# Patient Record
Sex: FEMALE | Race: WHITE | NOT HISPANIC OR LATINO | Employment: UNEMPLOYED | URBAN - METROPOLITAN AREA
[De-identification: names, ages, dates, MRNs, and addresses within clinical notes are randomized per-mention and may not be internally consistent; named-entity substitution may affect disease eponyms.]

---

## 2019-07-16 ENCOUNTER — HOSPITAL ENCOUNTER (EMERGENCY)
Facility: HOSPITAL | Age: 54
Discharge: PSYCHIATRIC OR SUBSTANCE USE FACILITY | End: 2019-07-17
Attending: EMERGENCY MEDICINE | Admitting: EMERGENCY MEDICINE

## 2019-07-16 VITALS
HEART RATE: 80 BPM | HEIGHT: 67 IN | SYSTOLIC BLOOD PRESSURE: 132 MMHG | DIASTOLIC BLOOD PRESSURE: 86 MMHG | RESPIRATION RATE: 16 BRPM | WEIGHT: 147 LBS | BODY MASS INDEX: 23.07 KG/M2 | TEMPERATURE: 97.9 F | OXYGEN SATURATION: 99 %

## 2019-07-16 DIAGNOSIS — F29 PSYCHOSIS, UNSPECIFIED PSYCHOSIS TYPE (HCC): Primary | ICD-10-CM

## 2019-07-16 LAB
AMPHET+METHAMPHET UR QL: NEGATIVE
BARBITURATES UR QL SCN: NORMAL
BENZODIAZ UR QL SCN: NEGATIVE
CANNABINOIDS SERPL QL: NEGATIVE
COCAINE UR QL: NEGATIVE
CREAT UR-MCNC: 215.6 MG/DL
METHADONE UR QL SCN: NEGATIVE
OPIATES UR QL: NEGATIVE
PCP UR QL SCN: NEGATIVE

## 2019-07-16 PROCEDURE — 80307 DRUG TEST PRSMV CHEM ANLYZR: CPT | Performed by: EMERGENCY MEDICINE

## 2019-07-16 PROCEDURE — P9612 CATHETERIZE FOR URINE SPEC: HCPCS

## 2019-07-16 PROCEDURE — 96372 THER/PROPH/DIAG INJ SC/IM: CPT

## 2019-07-16 PROCEDURE — 99285 EMERGENCY DEPT VISIT HI MDM: CPT

## 2019-07-16 PROCEDURE — 25010000002 ZIPRASIDONE MESYLATE PER 10 MG: Performed by: EMERGENCY MEDICINE

## 2019-07-16 PROCEDURE — 82570 ASSAY OF URINE CREATININE: CPT | Performed by: EMERGENCY MEDICINE

## 2019-07-16 RX ORDER — ZIPRASIDONE MESYLATE 20 MG/ML
10 INJECTION, POWDER, LYOPHILIZED, FOR SOLUTION INTRAMUSCULAR ONCE
Status: COMPLETED | OUTPATIENT
Start: 2019-07-16 | End: 2019-07-16

## 2019-07-16 RX ADMIN — ZIPRASIDONE MESYLATE 10 MG: 20 INJECTION, POWDER, LYOPHILIZED, FOR SOLUTION INTRAMUSCULAR at 23:19

## 2020-02-03 ENCOUNTER — HOSPITAL ENCOUNTER (EMERGENCY)
Facility: HOSPITAL | Age: 55
Discharge: HOME | End: 2020-02-04
Admitting: EMERGENCY MEDICINE

## 2020-02-03 DIAGNOSIS — K08.89 PAIN, DENTAL: Primary | ICD-10-CM

## 2020-02-03 DIAGNOSIS — K04.7 DENTAL ABSCESS: ICD-10-CM

## 2020-02-03 DIAGNOSIS — K02.9 DENTAL CARIES: ICD-10-CM

## 2020-02-03 PROCEDURE — 99283 EMERGENCY DEPT VISIT LOW MDM: CPT

## 2020-02-04 VITALS
OXYGEN SATURATION: 98 % | WEIGHT: 144.18 LBS | TEMPERATURE: 98.1 F | SYSTOLIC BLOOD PRESSURE: 112 MMHG | BODY MASS INDEX: 24.62 KG/M2 | HEART RATE: 98 BPM | DIASTOLIC BLOOD PRESSURE: 64 MMHG | HEIGHT: 64 IN | RESPIRATION RATE: 16 BRPM

## 2020-02-04 RX ORDER — CLINDAMYCIN PHOSPHATE 600 MG/50ML
600 INJECTION, SOLUTION INTRAVENOUS ONCE
Status: DISCONTINUED | OUTPATIENT
Start: 2020-02-04 | End: 2020-02-04

## 2020-02-04 RX ORDER — IBUPROFEN 800 MG/1
800 TABLET ORAL EVERY 6 HOURS PRN
Qty: 30 TABLET | Refills: 0 | OUTPATIENT
Start: 2020-02-04 | End: 2021-02-04

## 2020-02-04 RX ORDER — SODIUM CHLORIDE 0.9 % (FLUSH) 0.9 %
10 SYRINGE (ML) INJECTION AS NEEDED
Status: DISCONTINUED | OUTPATIENT
Start: 2020-02-04 | End: 2020-02-04 | Stop reason: HOSPADM

## 2020-02-04 RX ORDER — KETOROLAC TROMETHAMINE 30 MG/ML
30 INJECTION, SOLUTION INTRAMUSCULAR; INTRAVENOUS ONCE
Status: DISCONTINUED | OUTPATIENT
Start: 2020-02-04 | End: 2020-02-04

## 2020-02-04 RX ORDER — AMOXICILLIN AND CLAVULANATE POTASSIUM 875; 125 MG/1; MG/1
1 TABLET, FILM COATED ORAL 2 TIMES DAILY
Qty: 14 TABLET | Refills: 0 | Status: SHIPPED | OUTPATIENT
Start: 2020-02-04 | End: 2020-02-11

## 2020-02-04 RX ORDER — IBUPROFEN 400 MG/1
800 TABLET ORAL ONCE
Status: COMPLETED | OUTPATIENT
Start: 2020-02-04 | End: 2020-02-04

## 2020-02-04 RX ORDER — AMOXICILLIN AND CLAVULANATE POTASSIUM 875; 125 MG/1; MG/1
1 TABLET, FILM COATED ORAL ONCE
Status: COMPLETED | OUTPATIENT
Start: 2020-02-04 | End: 2020-02-04

## 2020-02-04 RX ADMIN — IBUPROFEN 800 MG: 400 TABLET ORAL at 00:43

## 2020-02-04 RX ADMIN — AMOXICILLIN AND CLAVULANATE POTASSIUM 1 TABLET: 875; 125 TABLET, FILM COATED ORAL at 00:43

## 2020-02-04 RX ADMIN — LIDOCAINE HYDROCHLORIDE: 20 SOLUTION ORAL; TOPICAL at 00:43

## 2020-02-04 NOTE — ED TRIAGE NOTES
Pt c/o rt lower tooth pain starting 1.5 days ago. Pt reports has infection to rt great toe and bilateral 3rd and 4th toes for 2 days.

## 2020-02-04 NOTE — ED PROVIDER NOTES
Subjective   History of Present Illness  Patient is a 54-year-old female presents with complaints of right lower dental pain for the past day and a half.  Patient states she is taken no medications for symptoms.  She denies any sore throat or having trouble handling her oral secretions.  Stressed denies any fever nausea vomiting chest pain or shortness of breath.  Currently describes the pain as a intermittent throbbing type pain that she rates a 5/10 severity.  Denies any purulent drainage or swelling to the area.  States she has been chewing on a piece of paper which seems to help with the pain some.  She has had some intermittent pruritic areas in between her third and fourth toes bilaterally denies any laceration or redness that she is noted.  Review of Systems   Constitutional: Negative.    HENT: Positive for dental problem. Negative for congestion, drooling, ear discharge, ear pain, facial swelling, rhinorrhea, sinus pressure, sinus pain, sore throat and trouble swallowing.    Respiratory: Negative.    Cardiovascular: Negative.    Gastrointestinal: Negative for nausea and vomiting.   Musculoskeletal: Negative for neck pain and neck stiffness.   Skin: Negative for pallor, rash and wound.   Neurological: Negative for dizziness, weakness, light-headedness, numbness and headaches.       History reviewed. No pertinent past medical history.    Allergies   Allergen Reactions   • Morphine Unknown (See Comments)     unknown       History reviewed. No pertinent surgical history.    History reviewed. No pertinent family history.    Social History     Socioeconomic History   • Marital status: Single     Spouse name: Not on file   • Number of children: Not on file   • Years of education: Not on file   • Highest education level: Not on file   Tobacco Use   • Smoking status: Never Smoker   Substance and Sexual Activity   • Alcohol use: Not Currently   • Drug use: Not Currently   • Sexual activity: Defer           Objective  "  Physical Exam   Constitutional: She is oriented to person, place, and time. She appears well-developed and well-nourished. No distress.   HENT:   Head: Normocephalic and atraumatic.   Mouth/Throat: Uvula is midline, oropharynx is clear and moist and mucous membranes are normal. No trismus in the jaw. Dental caries present. No uvula swelling. No oropharyngeal exudate, posterior oropharyngeal edema, posterior oropharyngeal erythema or tonsillar abscesses. No tonsillar exudate.   Gumline of the right lower area is erythematous no fluctuant abscess noted at this time tenderness with percussion along the 28 through 30th tooth.  Airways patent   Eyes: Pupils are equal, round, and reactive to light. EOM are normal. No scleral icterus.   Neck: Normal range of motion. Neck supple.   Cardiovascular: Normal rate, regular rhythm, normal heart sounds and intact distal pulses. Exam reveals no gallop and no friction rub.   No murmur heard.  Pulmonary/Chest: Effort normal. No stridor. She has no wheezes. She has no rales.   Lymphadenopathy:     She has no cervical adenopathy.   Neurological: She is alert and oriented to person, place, and time. No cranial nerve deficit.   Skin: Skin is warm and intact. Capillary refill takes less than 2 seconds. No lesion and no rash noted. She is not diaphoretic. No erythema. No pallor.   No rash noted in the inner webspaces of toes bilaterally skin is intact.   Psychiatric: She has a normal mood and affect. Her behavior is normal.   Nursing note and vitals reviewed.      Procedures           ED Course    /64 (BP Location: Right arm)   Pulse 98   Temp 98.1 °F (36.7 °C) (Oral)   Resp 16   Ht 162.6 cm (64\")   Wt 65.4 kg (144 lb 2.9 oz)   SpO2 98%   BMI 24.75 kg/m²   Medications   sodium chloride 0.9 % flush 10 mL (has no administration in time range)   dental ball oral suspension with diphenhydrAMINE ( Swish & Spit Given 2/4/20 0043)   ibuprofen (ADVIL,MOTRIN) tablet 800 mg (800 mg " Oral Given 2/4/20 0043)   amoxicillin-clavulanate (AUGMENTIN) 875-125 MG per tablet 1 tablet (1 tablet Oral Given 2/4/20 0043)     Labs Reviewed - No data to display  No radiology results for the last day                                             MDM  Number of Diagnoses or Management Options  Dental abscess:   Dental caries:   Pain, dental:   Diagnosis management comments: Chart Review:  Comorbidity: None  Differentials: Dental abscess, peritonsillar abscess, pharyngitis     ;this list is not all inclusive and does not constitute the entirety of considered causes  ECG: Not warranted  Labs: Not warranted  Imaging: Was interpreted by physician and reviewed by myself: Not warranted  Disposition/Treatment:  While in the ED IV was going to be placed patient was given to be given Toradol and clindamycin but she refused IV instead she was given ibuprofen and Augmentin p.o. she tolerated p.o. fluids she was afebrile and appeared nontoxic she was in no respiratory distress.  Patient will be given Augmentin for home she was also given dental balls and will be given a prescription for ibuprofen she was advised to follow-up with dentist and primary care provider.  There were no signs of infection noted in the inner webspaces of her toes signs of scabies noted in her hands or feet.   findings were discussed with the patient  who voiced understanding of discharge instructions along with signs and symptoms requiring return to the ED.  Upon discharged patient was in stable condition with followup for a revaluation.       Final diagnoses:   Pain, dental   Dental caries   Dental abscess            Antonette King PA  02/04/20 0046       Antonette King PA  02/04/20 0046

## 2020-02-04 NOTE — DISCHARGE INSTRUCTIONS
Take antibiotic as prescribed.  Be sure to take full course.  Consider taking probiotic or eating yogurt daily while on antibiotic and up to 10 days after to replace the good bacteria in your gastrointestinal tract; this can reduce chances of developing a GI infection such as C difficile    Use dental balls and ibuprofen as needed for pain.  Do not mix ibuprofen with other NSAIDs such as diclofenac or Aleve or naproxen    Follow-up with your primary care provider in 3-5 days.  If you do not have a primary care provider call [phone] for help in finding one, or you may follow up with [facility] at [phone].    Return to ED for any new or worsening symptoms

## 2020-05-03 ENCOUNTER — APPOINTMENT (OUTPATIENT)
Dept: GENERAL RADIOLOGY | Facility: HOSPITAL | Age: 55
End: 2020-05-03

## 2020-05-03 ENCOUNTER — HOSPITAL ENCOUNTER (EMERGENCY)
Facility: HOSPITAL | Age: 55
Discharge: LEFT BEFORE DISCHARGE | End: 2020-05-03
Admitting: EMERGENCY MEDICINE

## 2020-05-03 VITALS
DIASTOLIC BLOOD PRESSURE: 65 MMHG | HEART RATE: 105 BPM | TEMPERATURE: 99 F | HEIGHT: 66 IN | WEIGHT: 110 LBS | OXYGEN SATURATION: 98 % | SYSTOLIC BLOOD PRESSURE: 119 MMHG | RESPIRATION RATE: 18 BRPM | BODY MASS INDEX: 17.68 KG/M2

## 2020-05-03 DIAGNOSIS — M79.672 LEFT FOOT PAIN: Primary | ICD-10-CM

## 2020-05-03 DIAGNOSIS — R46.2 BIZARRE BEHAVIOR: ICD-10-CM

## 2020-05-03 PROCEDURE — 99283 EMERGENCY DEPT VISIT LOW MDM: CPT

## 2020-05-03 PROCEDURE — 25010000002 TDAP 5-2.5-18.5 LF-MCG/0.5 SUSPENSION: Performed by: PHYSICIAN ASSISTANT

## 2020-05-03 PROCEDURE — 90715 TDAP VACCINE 7 YRS/> IM: CPT | Performed by: PHYSICIAN ASSISTANT

## 2020-05-03 PROCEDURE — 99281 EMR DPT VST MAYX REQ PHY/QHP: CPT

## 2020-05-03 PROCEDURE — 90471 IMMUNIZATION ADMIN: CPT | Performed by: PHYSICIAN ASSISTANT

## 2020-05-03 RX ORDER — SODIUM CHLORIDE 0.9 % (FLUSH) 0.9 %
10 SYRINGE (ML) INJECTION AS NEEDED
Status: DISCONTINUED | OUTPATIENT
Start: 2020-05-03 | End: 2020-05-03 | Stop reason: HOSPADM

## 2020-05-03 RX ADMIN — TETANUS TOXOID, REDUCED DIPHTHERIA TOXOID AND ACELLULAR PERTUSSIS VACCINE, ADSORBED 0.5 ML: 5; 2.5; 8; 8; 2.5 SUSPENSION INTRAMUSCULAR at 18:57

## 2020-05-03 NOTE — ED PROVIDER NOTES
"Subjective   Patient is a 84-year-old  female who reports no significant past history was brought to the ER per EMS report \"we were called because she was doing cart wheels in the rain complained about a nail in her foot\".  Per EMS report, patient was picked up in parking lot and evaluated for nail in her left foot, they did not find any nail or any other injury.  Patient was then brought here for evaluation.  Patient is a poor historian, appears anxious, constantly twitching in bed patient states \"I stepped on a nail last night and her left foot is more swollen I feel the tetanus coming into my body\".  Patient states pain is mostly on the bottom of her left foot, radiating up her leg, rates a 5/10.  Patient denies any provocative or palliative factors.  Patient states she is not up-to-date on tetanus.  Patient denies any illicit drug use, patient does states that she drank 3 shots of whiskey prior to ER arrival.  Patient denies any suicidal homicidal ideations.  Patient denies any visual auditory hallucinations.      History provided by:  Patient      Review of Systems   Constitutional: Negative for fever.   HENT: Negative for sore throat and trouble swallowing.    Respiratory: Negative for shortness of breath and wheezing.    Cardiovascular: Negative for chest pain.   Gastrointestinal: Negative for abdominal pain.   Genitourinary: Negative for dysuria.   Musculoskeletal: Positive for arthralgias.        Left foot pain   Skin: Negative for rash.   Neurological: Negative for numbness and headaches.   Psychiatric/Behavioral: Negative for behavioral problems, hallucinations and suicidal ideas.   All other systems reviewed and are negative.      No past medical history on file.    Allergies   Allergen Reactions   • Morphine Unknown (See Comments)     unknown       No past surgical history on file.    No family history on file.    Social History     Socioeconomic History   • Marital status: Single     Spouse " name: Not on file   • Number of children: Not on file   • Years of education: Not on file   • Highest education level: Not on file   Tobacco Use   • Smoking status: Never Smoker   Substance and Sexual Activity   • Alcohol use: Not Currently   • Drug use: Not Currently   • Sexual activity: Defer           Objective   Physical Exam   Constitutional: She is oriented to person, place, and time. She appears well-developed and well-nourished.  Non-toxic appearance. She does not appear ill. No distress.   Patient is unkempt, appears disheveled, feet are dirty, have the appearance that she was not wearing shoes today.  Patient is awake, alert, oriented to place self and time.  She appears anxious, has some twitching motions   HENT:   Head: Normocephalic and atraumatic.   Mouth/Throat: Oropharynx is clear and moist.   Eyes: Pupils are equal, round, and reactive to light. EOM are normal.   Cardiovascular: Normal rate, regular rhythm, normal heart sounds and intact distal pulses.   No murmur heard.  Pulmonary/Chest: Effort normal and breath sounds normal. No respiratory distress. She has no wheezes.   Abdominal: Soft. Normal appearance and bowel sounds are normal. There is no CVA tenderness.   Musculoskeletal: Normal range of motion. She exhibits tenderness.   DP pulses and PT pulses present 2+ bilaterally  Sensation to soft touch intact  Motor strength 5/5 bilaterally  Homans sign negative   Neurological: She is alert and oriented to person, place, and time.   Skin: Skin is warm and dry. Capillary refill takes less than 2 seconds. No rash noted. No erythema.   Bottom of the feet dirty, parents that she has had more issues.  There appears to be very small punctate hallie on the ball of her left foot, no active bleeding, no surrounding erythema, induration or fluctuance  No foreign bodies are palpated  There is no erythema edema of the left foot or lower leg  No active bleeding   Psychiatric: She has a normal mood and affect. Her  "behavior is normal.   Nursing note and vitals reviewed.      Procedures           ED Course  ED Course as of May 03 1933   Sun May 03, 2020   1901 After patient was evaluated, physical exam was worn order in place notified by ER RN, Iram Vaughn the patient stated \"I think my foot feels better I do not think there is a nail in there anymore I do not want any treatment\".  Discussed with patient that I had ordered x-ray to evaluate her foot, patient states \"I do not think I need that\".  Patient was given tetanus shot.  Discussed risks and benefits of leaving without for evaluation, patient states she verbalized understanding, still wishes to leave.  Patient states that she has no suicidal homicidal ideations.  Patient is awake and oriented x3.    [MM]   1902 Patient states that she normally sleeps outside, states that she has someone to drive her home.  Patient does not appear to be danger to herself or others, currently refusing treatment.  Patient left AMA    [MM]      ED Course User Index  [MM] Iram Remy, PA    /65   Pulse 105   Temp 99 °F (37.2 °C) (Oral)   Resp 18   Ht 167.6 cm (66\")   Wt 49.9 kg (110 lb)   SpO2 98%   BMI 17.75 kg/m²   Labs Reviewed   BASIC METABOLIC PANEL   URINE DRUG SCREEN   ETHANOL   ACETAMINOPHEN LEVEL   CBC WITH AUTO DIFFERENTIAL   CBC AND DIFFERENTIAL    Narrative:     The following orders were created for panel order CBC & Differential.  Procedure                               Abnormality         Status                     ---------                               -----------         ------                     CBC Auto Differential[[id number]]                                                         Please view results for these tests on the individual orders.     Medications   sodium chloride 0.9 % flush 10 mL (has no administration in time range)   sodium chloride 0.9 % bolus 500 mL (has no administration in time range)   Tdap (BOOSTRIX) injection 0.5 mL (0.5 mL " "Intramuscular Given 5/3/20 3757)     No radiology results for the last day                                         MDM  Number of Diagnoses or Management Options  Bizarre behavior:   Left foot pain:   Diagnosis management comments: MEDICAL DECISION  Epic Chart Review: Patient with previous ER visits were noted for bizarre behavior  Comorbidities: Patient denies  Differentials: Illicit drug use, puncture wound, cellulitis, fracture; this list is not all inclusive and does not constitute the entirety of considered causes  Radiology interpretation:  Images reviewed by me and interpreted by radiologist, patient left ED prior to having x-ray done  Lab interpretation: Patient left AMA prior to having lab work obtained.    While in the ED appropriate PPE was worn during exam and throughout all encounters with the patient.  Patient is awake, alert, oriented to self, place.  Patient appears very anxious, constantly twitching in the bed, appears slightly paranoid.  Patient's physical exam is otherwise benign, left foot shows a small punctate hallie on the ball of her left foot, no active bleeding, erythema, swelling surrounding induration or fluctuance.  Left foot does not appear cellulitic, there is no foreign bodies appreciated or felt on exam.   Patient was given tetanus shot, as she states she does not know when her last shot was.  Prior to x-ray being done, ER RN, Iram Vaughn attempted to obtain lab work, patient refused IV, then stated \"I do not need any treatment I think my foot is much better, I don't think there is a nail in it any more\". Explained to the patient that I recommended imaging and labwork to evaluate her foot, patient refused again, stating that \"it feels much better, I don't need that IV\". Patient again denied suicidal or homicidal ideations.  Patient does not appear to be danger to herself or to anybody else.   Patient's bizarre behavior including doing cart wheels in the parking lot, appearing anxious and " twitching in the room seems to be somewhat consistent with illicit drug use.  Patient had denied this when previously asked.  Patient left AMA after receiving tetanus shot.  As patient was walking out of the ER she stopped at the double doors to the exit and did a hand stand against the door.  She then opened the door on her own and walked out to the parking lot.        Final diagnoses:   Left foot pain   Bizarre behavior            Iram Remy PA  05/03/20 1933

## 2021-02-03 ENCOUNTER — HOSPITAL ENCOUNTER (EMERGENCY)
Facility: HOSPITAL | Age: 56
Discharge: HOME | End: 2021-02-03
Attending: EMERGENCY MEDICINE | Admitting: EMERGENCY MEDICINE

## 2021-02-03 ENCOUNTER — APPOINTMENT (OUTPATIENT)
Dept: GENERAL RADIOLOGY | Facility: HOSPITAL | Age: 56
End: 2021-02-03

## 2021-02-03 VITALS
TEMPERATURE: 98.4 F | WEIGHT: 120 LBS | HEIGHT: 64 IN | DIASTOLIC BLOOD PRESSURE: 78 MMHG | OXYGEN SATURATION: 98 % | HEART RATE: 97 BPM | RESPIRATION RATE: 16 BRPM | SYSTOLIC BLOOD PRESSURE: 120 MMHG | BODY MASS INDEX: 20.49 KG/M2

## 2021-02-03 DIAGNOSIS — S76.012A STRAIN OF LEFT HIP, INITIAL ENCOUNTER: Primary | ICD-10-CM

## 2021-02-03 PROCEDURE — 99283 EMERGENCY DEPT VISIT LOW MDM: CPT

## 2021-02-03 PROCEDURE — 73502 X-RAY EXAM HIP UNI 2-3 VIEWS: CPT

## 2021-02-04 ENCOUNTER — HOSPITAL ENCOUNTER (EMERGENCY)
Facility: HOSPITAL | Age: 56
Discharge: HOME | End: 2021-02-04
Admitting: EMERGENCY MEDICINE

## 2021-02-04 VITALS
HEIGHT: 64 IN | TEMPERATURE: 98.4 F | WEIGHT: 120 LBS | BODY MASS INDEX: 20.49 KG/M2 | DIASTOLIC BLOOD PRESSURE: 74 MMHG | RESPIRATION RATE: 16 BRPM | OXYGEN SATURATION: 100 % | SYSTOLIC BLOOD PRESSURE: 123 MMHG | HEART RATE: 87 BPM

## 2021-02-04 DIAGNOSIS — S76.012A STRAIN OF LEFT HIP, INITIAL ENCOUNTER: Primary | ICD-10-CM

## 2021-02-04 PROCEDURE — 99284 EMERGENCY DEPT VISIT MOD MDM: CPT

## 2021-02-04 RX ORDER — IBUPROFEN 400 MG/1
800 TABLET ORAL ONCE
Status: COMPLETED | OUTPATIENT
Start: 2021-02-04 | End: 2021-02-04

## 2021-02-04 RX ORDER — IBUPROFEN 800 MG/1
800 TABLET ORAL EVERY 6 HOURS PRN
Qty: 30 TABLET | Refills: 0 | Status: SHIPPED | OUTPATIENT
Start: 2021-02-04 | End: 2021-04-30

## 2021-02-04 RX ORDER — IBUPROFEN 800 MG/1
800 TABLET ORAL EVERY 6 HOURS PRN
Qty: 30 TABLET | Refills: 0 | Status: SHIPPED | OUTPATIENT
Start: 2021-02-04 | End: 2021-02-04

## 2021-02-04 RX ADMIN — IBUPROFEN 800 MG: 400 TABLET, FILM COATED ORAL at 10:34

## 2021-02-04 NOTE — ED PROVIDER NOTES
"Subjective   History of Present Illness  I pulled a muscle  55-year-old female presents from group home stating that she pulled a muscle earlier today in her left upper thigh when she was stretching.  She complains some mild to moderate pain in the left upper thigh anteriorly worse with hip flexion.  She denies any other injury and reports no numbness or weakness in the extremity  Review of Systems   Constitutional: Negative.    HENT: Negative.    Respiratory: Negative.    Cardiovascular: Negative.    Gastrointestinal: Negative.    Genitourinary: Negative.    Musculoskeletal: Negative for joint swelling.   Skin: Negative.    Neurological: Negative.        No past medical history on file.    Allergies   Allergen Reactions   • Morphine Unknown (See Comments)     unknown       No past surgical history on file.    No family history on file.    Social History     Socioeconomic History   • Marital status: Single     Spouse name: Not on file   • Number of children: Not on file   • Years of education: Not on file   • Highest education level: Not on file   Tobacco Use   • Smoking status: Never Smoker   Substance and Sexual Activity   • Alcohol use: Not Currently   • Drug use: Not Currently   • Sexual activity: Defer     Prior to Admission medications    Medication Sig Start Date End Date Taking? Authorizing Provider   ibuprofen (ADVIL,MOTRIN) 800 MG tablet Take 1 tablet by mouth Every 6 (Six) Hours As Needed for Moderate Pain . 2/4/20   Antonette King PA     /80   Pulse 99   Temp 99.2 °F (37.3 °C) (Oral)   Resp 16   Ht 162.6 cm (64\")   Wt 54.4 kg (120 lb)   SpO2 96%   BMI 20.60 kg/m²   I examined the patient using the appropriate personal protective equipment.          Objective   Physical Exam  General: Well-appearing, no acute distress  Psych: Oriented, pleasant affect  Respirations: Clear, nonlabored respirations  Skin: No rash, normal color  Extremities there is some mild tenderness with patient over " the anterior aspect of the left hip in the groin, she does have normal range of motion of the hip she does have some discomfort with flexion however.  There is no swelling there is no hernia there is no mass, there is normal pulses at the groin and normal pulses distally she has normal brisk cap refill distally thighs and calves are symmetric and nontender  Procedures           ED Course           Xr Hip With Or Without Pelvis 2 - 3 View Left    Result Date: 2/3/2021  No acute osseous abnormality.  Electronically Signed By-Hayden Mares MD On:2/3/2021 9:12 PM This report was finalized on [id number] by  Hayden Mares MD.                                    MDM  Patient has findings of a hip flexor strain.  She has no signs of vascular or neurologic abnormality or infection.  X-ray shows no acute bony injury.  She was advised the findings she is discharged in good condition was given warning signs for return  Final diagnoses:   Strain of left hip, initial encounter            Elpidio Roca MD  02/03/21 9552

## 2021-02-04 NOTE — ED PROVIDER NOTES
"Subjective   Patient is a 55-year-old female who presents with complaints of continued left hip pain since she was seen in the ED yesterday.  Patient initially reports that she hurt her hip while exercising a few days ago.  She reports \"I strained my muscle\" she states that an x-ray of her hip yesterday which was unremarkable and she was discharged at that time.  Patient states that the facility sent her back today to get a walker she is having difficulty ambulating on her own.  Patient currently rates her pain as moderate severity states it is only with certain movements and described as a sharp pain on the anterior aspect of her hip.  She has taken no medication for pain.  She denies any paresthesias numbness weakness of her lower extremities.  Chest denies any back pain chest pain shortness of breath abdominal pain nausea or vomiting.  She denies any urinary symptoms including dysuria hematuria.  Did speak to [facility] who states that there may be concerns for UTI but cannot elaborate further.          Review of Systems   Constitutional: Negative.    HENT: Negative.    Respiratory: Negative.    Cardiovascular: Negative.    Genitourinary: Negative.    Musculoskeletal: Positive for arthralgias. Negative for back pain, joint swelling, myalgias, neck pain and neck stiffness.   Skin: Negative.    Neurological: Negative.        No past medical history on file.    Allergies   Allergen Reactions   • Morphine Unknown (See Comments)     unknown       No past surgical history on file.    No family history on file.    Social History     Socioeconomic History   • Marital status: Single     Spouse name: Not on file   • Number of children: Not on file   • Years of education: Not on file   • Highest education level: Not on file   Tobacco Use   • Smoking status: Never Smoker   Substance and Sexual Activity   • Alcohol use: Not Currently   • Drug use: Not Currently   • Sexual activity: Defer           Objective   Physical " Exam  Vitals signs and nursing note reviewed.   Constitutional:       General: She is not in acute distress.     Appearance: She is well-developed. She is not ill-appearing, toxic-appearing or diaphoretic.   HENT:      Head: Normocephalic and atraumatic.      Mouth/Throat:      Mouth: Mucous membranes are moist.      Pharynx: Oropharynx is clear.   Eyes:      General: No scleral icterus.     Extraocular Movements: Extraocular movements intact.      Pupils: Pupils are equal, round, and reactive to light.   Neck:      Musculoskeletal: Normal range of motion. No muscular tenderness.   Cardiovascular:      Rate and Rhythm: Normal rate and regular rhythm.      Pulses: Normal pulses.      Heart sounds: Normal heart sounds. No murmur. No friction rub. No gallop.    Pulmonary:      Effort: Pulmonary effort is normal. No respiratory distress.      Breath sounds: Normal breath sounds. No stridor. No wheezing, rhonchi or rales.   Chest:      Chest wall: No tenderness.   Abdominal:      General: Bowel sounds are normal. There is no distension. There are no signs of injury.      Palpations: Abdomen is soft. There is no fluid wave, hepatomegaly, splenomegaly or mass.      Tenderness: There is no abdominal tenderness. There is no right CVA tenderness, left CVA tenderness, guarding or rebound.      Hernia: No hernia is present.   Musculoskeletal:      Right hip: Normal.      Left hip: She exhibits tenderness. She exhibits normal range of motion, normal strength, no bony tenderness, no swelling, no crepitus, no deformity and no laceration.      Left knee: Normal.      Lumbar back: Normal.      Comments: Tenderness over the anterior aspect of the left hip in the groin, range of motion is present but slightly decreased mostly noted with flexion.  Skin is intact with no overlying erythema edema or warmth noted.  The patient is neurovascularly intact distally with 2+ peripheral pulses. The patient has no pain over the greater  "trochanteric area or hip bursa.  Patient back is nontender with no step-offs.  Groin is nontender there is two-point femoral pulses      Skin:     General: Skin is warm.      Capillary Refill: Capillary refill takes less than 2 seconds.      Coloration: Skin is not cyanotic, jaundiced or pale.      Findings: No rash.   Neurological:      General: No focal deficit present.      Mental Status: She is alert and oriented to person, place, and time.   Psychiatric:         Mood and Affect: Mood normal.         Behavior: Behavior normal.         Procedures           ED Course  ED Course as of Feb 04 1125   Thu Feb 04, 2021   1035 Patient refusing urinalysis at this time.  Patient is alert and oriented    [AA]      ED Course User Index  [AA] Antonette King PA    /86 (BP Location: Left arm, Patient Position: Lying)   Pulse 84   Temp 98.6 °F (37 °C) (Oral)   Resp 18   Ht 162.6 cm (64\")   Wt 54.4 kg (120 lb)   SpO2 100%   BMI 20.60 kg/m²   Medications   ibuprofen (ADVIL,MOTRIN) tablet 800 mg (800 mg Oral Given 2/4/21 1034)     Labs Reviewed - No data to display  Xr Hip With Or Without Pelvis 2 - 3 View Left    Result Date: 2/3/2021  No acute osseous abnormality.  Electronically Signed By-Hayden Mares MD On:2/3/2021 9:12 PM This report was finalized on [id number] by  Hayden Mares MD.                                           MDM  Number of Diagnoses or Management Options  Strain of left hip, initial encounter:   Diagnosis management comments: Chart Review:  Comorbidity: As per past medical history  Labs:pt refused urinalysis  Imaging: Was interpreted by physician and reviewed by myself:  Xr Hip With Or Without Pelvis 2 - 3 View Left  Result Date: 2/3/2021  No acute osseous abnormality.  Electronically Signed By-Hayden Mares MD On:2/3/2021 9:12 PM This report was finalized on [id number] by  Hayden Mares MD.    Disposition/Treatment:  Appropriate PPE was worn during exam and throughout all encounters " with the patient.  Upon arrival to the ED patient is alert and oriented spoke to  at  [facility] regards to the patient he states that she was unable to get out of bed on her own today there is a walker at the facility but it is not prescribed to her therefore she cannot use it.  They thought that she may have a UTI but they were not sure however patient denies any urinary symptoms and is currently refusing the urinalysis she is alert and oriented at this time.  No new recent falls since she was seen yesterday x-ray as above showed no acute osseous abnormalities.  Findings still consistent with hip flexor strain was diagnosed yesterday.  She continues to haveneurological vascular infectious sign.  She was ambulatory with assistance with a walker. findings were discussed the patient voiced understanding of discharge along with signs and symptoms to return to the ED.  Patient be given ibuprofen for home and advised to follow-up with PCP and orthopedics for further evaluation and management if her pain continues.  Patient was in agreement with plan      Final diagnoses:   Strain of left hip, initial encounter            Antonette King PA  02/04/21 4935

## 2021-02-04 NOTE — ED NOTES
Call placed to [facility], Ross for patient . Ross reports concerns for patient  status. This nurse reports that patient denies issues/symptoms related to urinary concerns. Ross updated on patient concern for left hip and muscle strain related to work out injury and results of test performed. This nurse updated Ross on his concern for communication that was given to EMS and not [facility] related to [facility] concerns regarding patient crawling on the floor and relating that to a possible UTI.  This nurse explained that for future concerns on patients sent to [facility], that it is advisable for [facility] to call the ED directly to give report to a nurse. [facility] Ed number given at this time. This nurse reported to the provider the concern of Ross and [facility] regarding a possible UTI. No new orders received at this time.      Jose M Jones, RN  02/03/21 0130

## 2021-02-04 NOTE — ED NOTES
Pt reports to the ED today for c/o left hip pain. Pt reports that she feels like she pulled a muscle while exercising. Pt reports that she lives at [facility]. Pt reports that we are to call  # [phone] and speak to Ross at the time of discharge for a ride back to her facility.      Jose M Jones RN  02/03/21 [id number]

## 2021-02-04 NOTE — ED NOTES
Pt ambulated in room with rolling walker- no issues with walker- will notify provider     Adelita Vaughn  02/04/21 7274

## 2021-02-04 NOTE — DISCHARGE INSTRUCTIONS
Apply ice as needed for the next 72 hours to help with pain and swelling.  Use walker when ambulating.  Take ibuprofen as needed for pain.  Do not mix with other NSAID such as ibuprofen diclofenac or Aleve.  Use sparingly.    Follow-up with your primary care provider in 3-5 days.  If you do not have a primary care provider call [phone] for help in finding one, or you may follow up with [facility] at [phone].    Return to ED for any new or worsening symptoms

## 2021-02-04 NOTE — ED NOTES
Pt alert and oriented refusing urinalysis states she is here for a muscle spasm- provider notified- no new orders     Adelita Vaughn  02/04/21 6663

## 2021-04-13 ENCOUNTER — TRANSCRIBE ORDERS (OUTPATIENT)
Dept: CARDIOLOGY | Facility: CLINIC | Age: 56
End: 2021-04-13

## 2021-04-13 DIAGNOSIS — I95.1 AUTONOMIC ORTHOSTATIC HYPOTENSION: Primary | ICD-10-CM

## 2021-04-30 ENCOUNTER — OFFICE VISIT (OUTPATIENT)
Dept: CARDIOLOGY | Facility: CLINIC | Age: 56
End: 2021-04-30

## 2021-04-30 VITALS
HEART RATE: 98 BPM | HEIGHT: 64 IN | WEIGHT: 114 LBS | BODY MASS INDEX: 19.46 KG/M2 | SYSTOLIC BLOOD PRESSURE: 112 MMHG | OXYGEN SATURATION: 98 % | TEMPERATURE: 97.7 F | DIASTOLIC BLOOD PRESSURE: 78 MMHG

## 2021-04-30 DIAGNOSIS — R00.0 SINUS TACHYCARDIA: Primary | ICD-10-CM

## 2021-04-30 PROCEDURE — 99203 OFFICE O/P NEW LOW 30 MIN: CPT | Performed by: INTERNAL MEDICINE

## 2021-04-30 RX ORDER — PALIPERIDONE PALMITATE 234 MG/1.5ML
INJECTION INTRAMUSCULAR
COMMUNITY
Start: 2021-04-08 | End: 2021-04-30

## 2021-04-30 RX ORDER — IBUPROFEN 800 MG/1
800 TABLET ORAL EVERY 6 HOURS PRN
COMMUNITY

## 2021-04-30 RX ORDER — MULTIPLE VITAMINS W/ MINERALS TAB 9MG-400MCG
1 TAB ORAL DAILY
COMMUNITY

## 2021-04-30 RX ORDER — ATENOLOL 25 MG/1
TABLET ORAL
COMMUNITY
Start: 2021-01-25 | End: 2021-04-30

## 2021-05-05 ENCOUNTER — TELEPHONE (OUTPATIENT)
Dept: CARDIOLOGY | Facility: HOSPITAL | Age: 56
End: 2021-05-05

## 2021-05-07 ENCOUNTER — HOSPITAL ENCOUNTER (OUTPATIENT)
Dept: CARDIOLOGY | Facility: HOSPITAL | Age: 56
Discharge: HOME | End: 2021-05-07
Admitting: INTERNAL MEDICINE

## 2021-05-07 VITALS
SYSTOLIC BLOOD PRESSURE: 116 MMHG | BODY MASS INDEX: 19.46 KG/M2 | HEART RATE: 93 BPM | DIASTOLIC BLOOD PRESSURE: 61 MMHG | WEIGHT: 114 LBS | HEIGHT: 64 IN

## 2021-05-07 DIAGNOSIS — R00.0 SINUS TACHYCARDIA: ICD-10-CM

## 2021-05-07 PROCEDURE — 93306 TTE W/DOPPLER COMPLETE: CPT

## 2021-05-07 PROCEDURE — 93306 TTE W/DOPPLER COMPLETE: CPT | Performed by: INTERNAL MEDICINE

## 2021-05-12 LAB
BH CV ECHO MEAS - ACS: 2.2 CM
BH CV ECHO MEAS - AO MAX PG (FULL): 3.6 MMHG
BH CV ECHO MEAS - AO MAX PG: 7.8 MMHG
BH CV ECHO MEAS - AO MEAN PG (FULL): 3.1 MMHG
BH CV ECHO MEAS - AO MEAN PG: 5.1 MMHG
BH CV ECHO MEAS - AO ROOT AREA (BSA CORRECTED): 2.2
BH CV ECHO MEAS - AO ROOT AREA: 8.7 CM^2
BH CV ECHO MEAS - AO ROOT DIAM: 3.3 CM
BH CV ECHO MEAS - AO V2 MAX: 139.8 CM/SEC
BH CV ECHO MEAS - AO V2 MEAN: 110 CM/SEC
BH CV ECHO MEAS - AO V2 VTI: 24 CM
BH CV ECHO MEAS - ASC AORTA: 3.5 CM
BH CV ECHO MEAS - AVA(I,A): 2.4 CM^2
BH CV ECHO MEAS - AVA(I,D): 2.4 CM^2
BH CV ECHO MEAS - AVA(V,A): 2.3 CM^2
BH CV ECHO MEAS - AVA(V,D): 2.3 CM^2
BH CV ECHO MEAS - BSA(HAYCOCK): 1.5 M^2
BH CV ECHO MEAS - BSA: 1.5 M^2
BH CV ECHO MEAS - BZI_BMI: 19.6 KILOGRAMS/M^2
BH CV ECHO MEAS - BZI_METRIC_HEIGHT: 162.6 CM
BH CV ECHO MEAS - BZI_METRIC_WEIGHT: 51.7 KG
BH CV ECHO MEAS - EDV(CUBED): 145.1 ML
BH CV ECHO MEAS - EDV(MOD-SP2): 148.3 ML
BH CV ECHO MEAS - EDV(MOD-SP4): 107.9 ML
BH CV ECHO MEAS - EDV(TEICH): 132.7 ML
BH CV ECHO MEAS - EF(CUBED): 57.1 %
BH CV ECHO MEAS - EF(MOD-SP2): 54.7 %
BH CV ECHO MEAS - EF(MOD-SP4): 49.2 %
BH CV ECHO MEAS - EF(TEICH): 48.4 %
BH CV ECHO MEAS - ESV(CUBED): 62.3 ML
BH CV ECHO MEAS - ESV(MOD-SP2): 67.1 ML
BH CV ECHO MEAS - ESV(MOD-SP4): 54.8 ML
BH CV ECHO MEAS - ESV(TEICH): 68.5 ML
BH CV ECHO MEAS - FS: 24.6 %
BH CV ECHO MEAS - IVS/LVPW: 0.94
BH CV ECHO MEAS - IVSD: 1.1 CM
BH CV ECHO MEAS - LA DIMENSION(2D): 2.5 CM
BH CV ECHO MEAS - LV DIASTOLIC VOL/BSA (35-75): 70 ML/M^2
BH CV ECHO MEAS - LV MASS(C)D: 233.9 GRAMS
BH CV ECHO MEAS - LV MASS(C)DI: 151.9 GRAMS/M^2
BH CV ECHO MEAS - LV MAX PG: 4.2 MMHG
BH CV ECHO MEAS - LV MEAN PG: 2.1 MMHG
BH CV ECHO MEAS - LV SYSTOLIC VOL/BSA (12-30): 35.6 ML/M^2
BH CV ECHO MEAS - LV V1 MAX: 102.3 CM/SEC
BH CV ECHO MEAS - LV V1 MEAN: 66.3 CM/SEC
BH CV ECHO MEAS - LV V1 VTI: 18.4 CM
BH CV ECHO MEAS - LVIDD: 5.3 CM
BH CV ECHO MEAS - LVIDS: 4 CM
BH CV ECHO MEAS - LVOT AREA: 3.1 CM^2
BH CV ECHO MEAS - LVOT DIAM: 2 CM
BH CV ECHO MEAS - LVPWD: 1.2 CM
BH CV ECHO MEAS - MR MAX PG: 98 MMHG
BH CV ECHO MEAS - MR MAX VEL: 494.9 CM/SEC
BH CV ECHO MEAS - MR MEAN PG: 66.5 MMHG
BH CV ECHO MEAS - MR MEAN VEL: 387.4 CM/SEC
BH CV ECHO MEAS - MR VTI: 159.5 CM
BH CV ECHO MEAS - MV A MAX VEL: 64.3 CM/SEC
BH CV ECHO MEAS - MV DEC SLOPE: 289.3 CM/SEC^2
BH CV ECHO MEAS - MV DEC TIME: 0.18 SEC
BH CV ECHO MEAS - MV E MAX VEL: 50.8 CM/SEC
BH CV ECHO MEAS - MV E/A: 0.79
BH CV ECHO MEAS - MV MAX PG: 2.2 MMHG
BH CV ECHO MEAS - MV MEAN PG: 1.5 MMHG
BH CV ECHO MEAS - MV V2 MAX: 73.8 CM/SEC
BH CV ECHO MEAS - MV V2 MEAN: 60.1 CM/SEC
BH CV ECHO MEAS - MV V2 VTI: 13.3 CM
BH CV ECHO MEAS - MVA(VTI): 4.3 CM^2
BH CV ECHO MEAS - PA ACC TIME: 0.14 SEC
BH CV ECHO MEAS - PA MAX PG (FULL): 0.18 MMHG
BH CV ECHO MEAS - PA MAX PG: 3.3 MMHG
BH CV ECHO MEAS - PA MEAN PG (FULL): -0.16 MMHG
BH CV ECHO MEAS - PA MEAN PG: 1.5 MMHG
BH CV ECHO MEAS - PA PR(ACCEL): 15.2 MMHG
BH CV ECHO MEAS - PA V2 MAX: 91.3 CM/SEC
BH CV ECHO MEAS - PA V2 MEAN: 54.6 CM/SEC
BH CV ECHO MEAS - PA V2 VTI: 15.1 CM
BH CV ECHO MEAS - PI END-D VEL: 101.1 CM/SEC
BH CV ECHO MEAS - PI MAX PG: 11.2 MMHG
BH CV ECHO MEAS - PI MAX VEL: 167.5 CM/SEC
BH CV ECHO MEAS - PULM A REVS DUR: 0.19 SEC
BH CV ECHO MEAS - PULM A REVS VEL: 56.2 CM/SEC
BH CV ECHO MEAS - PULM DIAS VEL: 46.9 CM/SEC
BH CV ECHO MEAS - PULM S/D: 1.2
BH CV ECHO MEAS - PULM SYS VEL: 55.9 CM/SEC
BH CV ECHO MEAS - PVA(I,A): 8.6 CM^2
BH CV ECHO MEAS - PVA(I,D): 8.6 CM^2
BH CV ECHO MEAS - PVA(V,A): 6.5 CM^2
BH CV ECHO MEAS - PVA(V,D): 6.5 CM^2
BH CV ECHO MEAS - QP/QS: 2.3
BH CV ECHO MEAS - RAP SYSTOLE: 3 MMHG
BH CV ECHO MEAS - RV MAX PG: 3.2 MMHG
BH CV ECHO MEAS - RV MEAN PG: 1.6 MMHG
BH CV ECHO MEAS - RV V1 MAX: 88.8 CM/SEC
BH CV ECHO MEAS - RV V1 MEAN: 60 CM/SEC
BH CV ECHO MEAS - RV V1 VTI: 19.5 CM
BH CV ECHO MEAS - RVDD: 2.2 CM
BH CV ECHO MEAS - RVOT AREA: 6.7 CM^2
BH CV ECHO MEAS - RVOT DIAM: 2.9 CM
BH CV ECHO MEAS - RVSP: 19.7 MMHG
BH CV ECHO MEAS - SI(AO): 135.9 ML/M^2
BH CV ECHO MEAS - SI(CUBED): 53.8 ML/M^2
BH CV ECHO MEAS - SI(LVOT): 37.4 ML/M^2
BH CV ECHO MEAS - SI(MOD-SP2): 52.7 ML/M^2
BH CV ECHO MEAS - SI(MOD-SP4): 34.5 ML/M^2
BH CV ECHO MEAS - SI(TEICH): 41.7 ML/M^2
BH CV ECHO MEAS - SV(AO): 209.3 ML
BH CV ECHO MEAS - SV(CUBED): 82.8 ML
BH CV ECHO MEAS - SV(LVOT): 57.6 ML
BH CV ECHO MEAS - SV(MOD-SP2): 81.2 ML
BH CV ECHO MEAS - SV(MOD-SP4): 53.1 ML
BH CV ECHO MEAS - SV(RVOT): 130.3 ML
BH CV ECHO MEAS - SV(TEICH): 64.2 ML
BH CV ECHO MEAS - TR MAX VEL: 204.2 CM/SEC

## 2021-07-14 ENCOUNTER — HOSPITAL ENCOUNTER (OUTPATIENT)
Dept: GENERAL RADIOLOGY | Facility: HOSPITAL | Age: 56
Discharge: HOME | End: 2021-07-14

## 2021-07-14 ENCOUNTER — TRANSCRIBE ORDERS (OUTPATIENT)
Dept: ADMINISTRATIVE | Facility: HOSPITAL | Age: 56
End: 2021-07-14

## 2021-07-14 DIAGNOSIS — Z02.71 ENCOUNTER FOR DISABILITY DETERMINATION: Primary | ICD-10-CM

## 2021-07-14 DIAGNOSIS — Z02.71 ENCOUNTER FOR DISABILITY DETERMINATION: ICD-10-CM

## 2021-07-14 PROCEDURE — 73100 X-RAY EXAM OF WRIST: CPT

## 2021-07-14 PROCEDURE — 72040 X-RAY EXAM NECK SPINE 2-3 VW: CPT
